# Patient Record
Sex: MALE | Race: WHITE | NOT HISPANIC OR LATINO | Employment: FULL TIME | URBAN - NONMETROPOLITAN AREA
[De-identification: names, ages, dates, MRNs, and addresses within clinical notes are randomized per-mention and may not be internally consistent; named-entity substitution may affect disease eponyms.]

---

## 2023-03-04 LAB
ALANINE AMINOTRANSFERASE (SGPT) (U/L) IN SER/PLAS: 31 U/L (ref 10–52)
ALBUMIN (G/DL) IN SER/PLAS: 3.9 G/DL (ref 3.4–5)
ALKALINE PHOSPHATASE (U/L) IN SER/PLAS: 56 U/L (ref 33–120)
ANION GAP IN SER/PLAS: 11 MMOL/L (ref 10–20)
ASPARTATE AMINOTRANSFERASE (SGOT) (U/L) IN SER/PLAS: 25 U/L (ref 9–39)
BILIRUBIN TOTAL (MG/DL) IN SER/PLAS: 0.5 MG/DL (ref 0–1.2)
CALCIUM (MG/DL) IN SER/PLAS: 9.3 MG/DL (ref 8.6–10.3)
CARBON DIOXIDE, TOTAL (MMOL/L) IN SER/PLAS: 29 MMOL/L (ref 21–32)
CHLORIDE (MMOL/L) IN SER/PLAS: 99 MMOL/L (ref 98–107)
CHOLESTEROL (MG/DL) IN SER/PLAS: 208 MG/DL (ref 0–199)
CHOLESTEROL IN HDL (MG/DL) IN SER/PLAS: 30 MG/DL
CHOLESTEROL/HDL RATIO: 6.9
CREATININE (MG/DL) IN SER/PLAS: 0.64 MG/DL (ref 0.5–1.3)
ERYTHROCYTE DISTRIBUTION WIDTH (RATIO) BY AUTOMATED COUNT: 12.7 % (ref 11.5–14.5)
ERYTHROCYTE MEAN CORPUSCULAR HEMOGLOBIN CONCENTRATION (G/DL) BY AUTOMATED: 33.3 G/DL (ref 32–36)
ERYTHROCYTE MEAN CORPUSCULAR VOLUME (FL) BY AUTOMATED COUNT: 88 FL (ref 80–100)
ERYTHROCYTES (10*6/UL) IN BLOOD BY AUTOMATED COUNT: 4.98 X10E12/L (ref 4.5–5.9)
ESTIMATED AVERAGE GLUCOSE FOR HBA1C: 220 MG/DL
GFR MALE: >90 ML/MIN/1.73M2
GLUCOSE (MG/DL) IN SER/PLAS: 258 MG/DL (ref 74–99)
HEMATOCRIT (%) IN BLOOD BY AUTOMATED COUNT: 43.6 % (ref 41–52)
HEMOGLOBIN (G/DL) IN BLOOD: 14.5 G/DL (ref 13.5–17.5)
HEMOGLOBIN A1C/HEMOGLOBIN TOTAL IN BLOOD: 9.3 %
LDL: 126 MG/DL (ref 0–99)
LEUKOCYTES (10*3/UL) IN BLOOD BY AUTOMATED COUNT: 7.7 X10E9/L (ref 4.4–11.3)
NON HDL CHOLESTEROL: 178 MG/DL
PLATELETS (10*3/UL) IN BLOOD AUTOMATED COUNT: 371 X10E9/L (ref 150–450)
POTASSIUM (MMOL/L) IN SER/PLAS: 4.2 MMOL/L (ref 3.5–5.3)
PROSTATE SPECIFIC ANTIGEN,SCREEN: 0.08 NG/ML (ref 0–4)
PROTEIN TOTAL: 7.9 G/DL (ref 6.4–8.2)
SODIUM (MMOL/L) IN SER/PLAS: 135 MMOL/L (ref 136–145)
THYROTROPIN (MIU/L) IN SER/PLAS BY DETECTION LIMIT <= 0.05 MIU/L: 1.02 MIU/L (ref 0.44–3.98)
TRIGLYCERIDE (MG/DL) IN SER/PLAS: 259 MG/DL (ref 0–149)
UREA NITROGEN (MG/DL) IN SER/PLAS: 13 MG/DL (ref 6–23)
VLDL: 52 MG/DL (ref 0–40)

## 2023-03-06 ENCOUNTER — DOCUMENTATION (OUTPATIENT)
Dept: PRIMARY CARE | Facility: CLINIC | Age: 43
End: 2023-03-06
Payer: COMMERCIAL

## 2023-03-07 PROBLEM — M79.672 BILATERAL LEG AND FOOT PAIN: Status: ACTIVE | Noted: 2023-03-07

## 2023-03-07 PROBLEM — E66.9 OBESITY: Status: ACTIVE | Noted: 2023-03-07

## 2023-03-07 PROBLEM — I89.0 LYMPHEDEMA: Status: ACTIVE | Noted: 2023-03-07

## 2023-03-07 PROBLEM — I10 BENIGN ESSENTIAL HYPERTENSION: Status: ACTIVE | Noted: 2023-03-07

## 2023-03-07 PROBLEM — M25.551 BILATERAL HIP PAIN: Status: ACTIVE | Noted: 2023-03-07

## 2023-03-07 PROBLEM — M79.604 BILATERAL LEG AND FOOT PAIN: Status: ACTIVE | Noted: 2023-03-07

## 2023-03-07 PROBLEM — R07.89 ATYPICAL CHEST PAIN: Status: ACTIVE | Noted: 2023-03-07

## 2023-03-07 PROBLEM — J45.909 ASTHMA (HHS-HCC): Status: ACTIVE | Noted: 2023-03-07

## 2023-03-07 PROBLEM — M79.671 BILATERAL LEG AND FOOT PAIN: Status: ACTIVE | Noted: 2023-03-07

## 2023-03-07 PROBLEM — F41.9 ANXIETY DISORDER: Status: ACTIVE | Noted: 2023-03-07

## 2023-03-07 PROBLEM — D17.0 LIPOMA OF SCALP: Status: ACTIVE | Noted: 2023-03-07

## 2023-03-07 PROBLEM — M54.50 LOW BACK PAIN: Status: ACTIVE | Noted: 2023-03-07

## 2023-03-07 PROBLEM — M25.552 BILATERAL HIP PAIN: Status: ACTIVE | Noted: 2023-03-07

## 2023-03-07 PROBLEM — M79.605 BILATERAL LEG AND FOOT PAIN: Status: ACTIVE | Noted: 2023-03-07

## 2023-03-07 RX ORDER — CHOLECALCIFEROL (VITAMIN D3) 25 MCG
25 TABLET ORAL DAILY
COMMUNITY

## 2023-03-07 RX ORDER — DULOXETIN HYDROCHLORIDE 30 MG/1
1 CAPSULE, DELAYED RELEASE ORAL 2 TIMES DAILY
COMMUNITY
Start: 2019-05-03 | End: 2023-12-18

## 2023-03-07 RX ORDER — LISINOPRIL AND HYDROCHLOROTHIAZIDE 12.5; 2 MG/1; MG/1
2 TABLET ORAL DAILY
COMMUNITY
Start: 2019-04-24 | End: 2023-05-09

## 2023-03-07 RX ORDER — PHENOL 1.4 %
1 AEROSOL, SPRAY (ML) MUCOUS MEMBRANE DAILY
COMMUNITY
Start: 2019-10-02

## 2023-03-07 RX ORDER — METOPROLOL SUCCINATE 25 MG/1
1 TABLET, EXTENDED RELEASE ORAL DAILY
COMMUNITY
Start: 2019-04-19 | End: 2023-05-01

## 2023-03-13 ENCOUNTER — OFFICE VISIT (OUTPATIENT)
Dept: PRIMARY CARE | Facility: CLINIC | Age: 43
End: 2023-03-13
Payer: COMMERCIAL

## 2023-03-13 VITALS
HEIGHT: 69 IN | BODY MASS INDEX: 42.21 KG/M2 | OXYGEN SATURATION: 98 % | WEIGHT: 285 LBS | DIASTOLIC BLOOD PRESSURE: 73 MMHG | HEART RATE: 84 BPM | SYSTOLIC BLOOD PRESSURE: 106 MMHG

## 2023-03-13 DIAGNOSIS — E78.2 MIXED DYSLIPIDEMIA: ICD-10-CM

## 2023-03-13 DIAGNOSIS — E11.9 TYPE 2 DIABETES MELLITUS WITHOUT COMPLICATION, WITHOUT LONG-TERM CURRENT USE OF INSULIN (MULTI): Primary | ICD-10-CM

## 2023-03-13 PROCEDURE — 1036F TOBACCO NON-USER: CPT | Performed by: PHYSICIAN ASSISTANT

## 2023-03-13 PROCEDURE — 3078F DIAST BP <80 MM HG: CPT | Performed by: PHYSICIAN ASSISTANT

## 2023-03-13 PROCEDURE — 3046F HEMOGLOBIN A1C LEVEL >9.0%: CPT | Performed by: PHYSICIAN ASSISTANT

## 2023-03-13 PROCEDURE — 3074F SYST BP LT 130 MM HG: CPT | Performed by: PHYSICIAN ASSISTANT

## 2023-03-13 PROCEDURE — 99214 OFFICE O/P EST MOD 30 MIN: CPT | Performed by: PHYSICIAN ASSISTANT

## 2023-03-13 RX ORDER — METFORMIN HYDROCHLORIDE 500 MG/1
500 TABLET ORAL
Qty: 60 TABLET | Refills: 5 | Status: SHIPPED | OUTPATIENT
Start: 2023-03-13 | End: 2023-07-18

## 2023-03-13 ASSESSMENT — PATIENT HEALTH QUESTIONNAIRE - PHQ9
1. LITTLE INTEREST OR PLEASURE IN DOING THINGS: NOT AT ALL
2. FEELING DOWN, DEPRESSED OR HOPELESS: NOT AT ALL
SUM OF ALL RESPONSES TO PHQ9 QUESTIONS 1 AND 2: 0

## 2023-03-13 NOTE — PROGRESS NOTES
Subjective   Patient ID: Juancarlos Ortiz is a 42 y.o. male who presents for Follow-up (Go over lab results. ).    HPI     Review of Systems    Objective   There were no vitals taken for this visit.    Physical Exam    Assessment/Plan

## 2023-03-13 NOTE — PROGRESS NOTES
"Subjective   Patient ID: Juancarlos Ortiz is a 42 y.o. male who presents for Follow-up (Go over lab results. ).  HPI  Patient presents in follow-up of recent lab draw.  Patient's A1c is 9.3 with mixed dyslipidemia.  Patient was notified of this and presents today to discuss options in treatment and management.  Review of Systems    Constitutional:  See HPI .    Neurologic:  Alert and oriented X4, No numbness, No tingling.    All other systems are negative     Objective     /73   Pulse 84   Ht 1.753 m (5' 9\")   Wt 129 kg (285 lb)   SpO2 98%   BMI 42.09 kg/m²     Physical Exam    General:  Alert and oriented, No acute distress.    Eye:  Pupils are equal, round and reactive to light, Normal conjunctiva.    HENT:  Normocephalic,   Neck:  Supple    Respiratory: Respirations are non-labored   Musculoskeletal: Normal ROM and strength  Integumentary:  Warm, Dry, Intact, No pallor, No rash.    Neurologic:  Alert, Oriented, Normal sensory, Cranial Nerves II-XII are grossly intact  Psychiatric:  Cooperative, Appropriate mood & affect.    Assessment/Plan   Type 2 diabetes: Treatment options reviewed.  Patient is dedicated to dietary changes as well as increased exercise with a goal of not requiring treatment of this.  Metformin 500 mg twice a day for at least 6 months, hopefully not longer.  Follow-up A1c and lipids ordered for that time to be obtained prior to follow-up.  Patient requesting deferring statin to see if lifestyle changes will be adequate.  Problem List Items Addressed This Visit    None  Visit Diagnoses       Type 2 diabetes mellitus without complication, without long-term current use of insulin (CMS/[facility])    -  Primary    Relevant Medications    metFORMIN (Glucophage) 500 mg tablet    Other Relevant Orders    Hemoglobin A1C    Lipid Panel    Mixed dyslipidemia              "

## 2023-05-01 DIAGNOSIS — I10 BENIGN ESSENTIAL HYPERTENSION: Primary | ICD-10-CM

## 2023-05-01 RX ORDER — METOPROLOL SUCCINATE 25 MG/1
TABLET, EXTENDED RELEASE ORAL
Qty: 90 TABLET | Refills: 3 | Status: SHIPPED | OUTPATIENT
Start: 2023-05-01 | End: 2024-04-25

## 2023-05-09 DIAGNOSIS — I10 BENIGN ESSENTIAL HYPERTENSION: Primary | ICD-10-CM

## 2023-05-09 RX ORDER — LISINOPRIL AND HYDROCHLOROTHIAZIDE 12.5; 2 MG/1; MG/1
TABLET ORAL
Qty: 180 TABLET | Refills: 3 | Status: SHIPPED | OUTPATIENT
Start: 2023-05-09 | End: 2024-05-03

## 2023-07-18 DIAGNOSIS — E11.9 TYPE 2 DIABETES MELLITUS WITHOUT COMPLICATION, WITHOUT LONG-TERM CURRENT USE OF INSULIN (MULTI): ICD-10-CM

## 2023-07-18 RX ORDER — METFORMIN HYDROCHLORIDE 500 MG/1
TABLET ORAL
Qty: 180 TABLET | Refills: 3 | Status: SHIPPED | OUTPATIENT
Start: 2023-07-18

## 2023-09-13 ENCOUNTER — APPOINTMENT (OUTPATIENT)
Dept: PRIMARY CARE | Facility: CLINIC | Age: 43
End: 2023-09-13
Payer: COMMERCIAL

## 2023-12-18 DIAGNOSIS — F41.1 GENERALIZED ANXIETY DISORDER: Primary | ICD-10-CM

## 2023-12-18 RX ORDER — DULOXETIN HYDROCHLORIDE 30 MG/1
30 CAPSULE, DELAYED RELEASE ORAL 2 TIMES DAILY
Qty: 180 CAPSULE | Refills: 3 | Status: SHIPPED | OUTPATIENT
Start: 2023-12-18

## 2024-01-02 ENCOUNTER — TELEPHONE (OUTPATIENT)
Dept: PRIMARY CARE | Facility: CLINIC | Age: 44
End: 2024-01-02
Payer: COMMERCIAL

## 2024-01-02 DIAGNOSIS — E11.9 TYPE 2 DIABETES MELLITUS WITHOUT COMPLICATION, WITHOUT LONG-TERM CURRENT USE OF INSULIN (MULTI): ICD-10-CM

## 2024-01-02 DIAGNOSIS — E11.9 TYPE 2 DIABETES MELLITUS WITHOUT COMPLICATION, WITHOUT LONG-TERM CURRENT USE OF INSULIN (MULTI): Primary | ICD-10-CM

## 2024-01-02 RX ORDER — BLOOD SUGAR DIAGNOSTIC
1 STRIP MISCELLANEOUS DAILY PRN
Qty: 100 EACH | Refills: 3 | Status: SHIPPED | OUTPATIENT
Start: 2024-01-02

## 2024-01-02 RX ORDER — IBUPROFEN 200 MG
1 CAPSULE ORAL 2 TIMES DAILY
Qty: 100 STRIP | Refills: 6 | Status: SHIPPED | OUTPATIENT
Start: 2024-01-02

## 2024-01-02 RX ORDER — INSULIN PUMP SYRINGE, 3 ML
1 EACH MISCELLANEOUS 2 TIMES DAILY
COMMUNITY
End: 2024-01-02 | Stop reason: ENTERED-IN-ERROR

## 2024-01-02 RX ORDER — LANCETS
EACH MISCELLANEOUS
Qty: 100 EACH | Refills: 3 | Status: SHIPPED | OUTPATIENT
Start: 2024-01-02

## 2024-01-02 RX ORDER — INSULIN PUMP SYRINGE, 3 ML
1 EACH MISCELLANEOUS AS NEEDED
Qty: 1 EACH | Refills: 0 | Status: SHIPPED | OUTPATIENT
Start: 2024-01-02 | End: 2024-01-02 | Stop reason: SDUPTHER

## 2024-01-20 ENCOUNTER — OFFICE VISIT (OUTPATIENT)
Dept: PRIMARY CARE | Facility: CLINIC | Age: 44
End: 2024-01-20
Payer: COMMERCIAL

## 2024-01-20 ENCOUNTER — LAB (OUTPATIENT)
Dept: LAB | Facility: LAB | Age: 44
End: 2024-01-20
Payer: COMMERCIAL

## 2024-01-20 VITALS
WEIGHT: 281.8 LBS | HEART RATE: 81 BPM | DIASTOLIC BLOOD PRESSURE: 81 MMHG | HEIGHT: 69 IN | BODY MASS INDEX: 41.74 KG/M2 | SYSTOLIC BLOOD PRESSURE: 121 MMHG

## 2024-01-20 DIAGNOSIS — E11.9 TYPE 2 DIABETES MELLITUS WITHOUT COMPLICATION, WITHOUT LONG-TERM CURRENT USE OF INSULIN (MULTI): ICD-10-CM

## 2024-01-20 DIAGNOSIS — F41.9 ANXIETY DISORDER, UNSPECIFIED TYPE: ICD-10-CM

## 2024-01-20 DIAGNOSIS — I10 BENIGN ESSENTIAL HYPERTENSION: Primary | ICD-10-CM

## 2024-01-20 DIAGNOSIS — E78.2 MIXED DYSLIPIDEMIA: ICD-10-CM

## 2024-01-20 LAB
CHOLEST SERPL-MCNC: 249 MG/DL (ref 0–199)
CHOLESTEROL/HDL RATIO: 7.3
EST. AVERAGE GLUCOSE BLD GHB EST-MCNC: 134 MG/DL
HBA1C MFR BLD: 6.3 %
HDLC SERPL-MCNC: 34 MG/DL
LDLC SERPL CALC-MCNC: 137 MG/DL
NON HDL CHOLESTEROL: 215 MG/DL (ref 0–149)
TRIGL SERPL-MCNC: 392 MG/DL (ref 0–149)
VLDL: 78 MG/DL (ref 0–40)

## 2024-01-20 PROCEDURE — 3074F SYST BP LT 130 MM HG: CPT | Performed by: PHYSICIAN ASSISTANT

## 2024-01-20 PROCEDURE — 83036 HEMOGLOBIN GLYCOSYLATED A1C: CPT

## 2024-01-20 PROCEDURE — 1036F TOBACCO NON-USER: CPT | Performed by: PHYSICIAN ASSISTANT

## 2024-01-20 PROCEDURE — 99214 OFFICE O/P EST MOD 30 MIN: CPT | Performed by: PHYSICIAN ASSISTANT

## 2024-01-20 PROCEDURE — 80061 LIPID PANEL: CPT

## 2024-01-20 PROCEDURE — 36415 COLL VENOUS BLD VENIPUNCTURE: CPT

## 2024-01-20 PROCEDURE — 3079F DIAST BP 80-89 MM HG: CPT | Performed by: PHYSICIAN ASSISTANT

## 2024-01-20 NOTE — PROGRESS NOTES
"Subjective   Patient ID: Juancarlos Ortiz is a 43 y.o. male who presents for Follow-up (FU 10 mos,  patient had labs drawn this morning, offers no complaints.).  HPI  Patient presents for annual follow-up and has no complaints.    Patient is currently being treated for hypertension, type 2 diabetes, dyslipidemia, with Cymbalta, lisinopril-hydrochlorothiazide, metformin, and metoprolol.  Patient is doing well with medications and reports making positive lifestyle changes in an effort to reduce A1c and cholesterol.  Patient did have lipids and A1c drawn prior to today's visit but just prior and they are pending.    Review of Systems    Constitutional: See HPI  Neurologic:  Alert and oriented X4, No numbness, No tingling.    All other systems are negative     Objective     /81   Pulse 81   Ht 1.753 m (5' 9\")   Wt 128 kg (281 lb 12.8 oz)   BMI 41.61 kg/m²     Physical Exam  General:  Alert and oriented, No acute distress.    Eye:  Pupils are equal, round and reactive to light, Extraocular movements are intact, Normal conjunctiva.    HENT:  Normocephalic, Normal hearing, Oral mucosa is moist, No pharyngeal erythema, No sinus tenderness.    Neck:  Supple, Non-tender, No lymphadenopathy.    Respiratory:  Lungs are clear to auscultation, Respirations are non-labored, Breath sounds are equal    Cardiovascular:  Normal rate, Regular rhythm.    Gastrointestinal:  Non-distended.    Musculoskeletal: Normal range of motion, Normal strength, No tenderness, No swelling, No deformity, Normal gait.     Integumentary:  Warm, Dry, Intact, No pallor, No rash.    Neurologic:  Alert, Oriented, Normal sensory, Normal motor function, No focal deficits, Cranial Nerves II-XII are grossly intact   Psychiatric:  Cooperative, Appropriate mood & affect.    Assessment/Plan   Hypertension: Good readings today.  Continue lisinopril/hydrochlorothiazide and metoprolol    Anxiety: Continue Cymbalta    Type 2 diabetes: Further recommendations " pending results.      Problem List Items Addressed This Visit       Benign essential hypertension - Primary     Other Visit Diagnoses       Type 2 diabetes mellitus without complication, without long-term current use of insulin (CMS/[facility])        Mixed dyslipidemia                Final diagnoses:   [I10] Benign essential hypertension   [E11.9] Type 2 diabetes mellitus without complication, without long-term current use of insulin (CMS/[facility])   [E78.2] Mixed dyslipidemia

## 2024-01-22 DIAGNOSIS — E78.5 DYSLIPIDEMIA: Primary | ICD-10-CM

## 2024-01-22 RX ORDER — ROSUVASTATIN CALCIUM 10 MG/1
10 TABLET, COATED ORAL DAILY
Qty: 30 TABLET | Refills: 11 | Status: SHIPPED | OUTPATIENT
Start: 2024-01-22 | End: 2025-01-21

## 2024-04-25 DIAGNOSIS — I10 BENIGN ESSENTIAL HYPERTENSION: ICD-10-CM

## 2024-04-25 RX ORDER — METOPROLOL SUCCINATE 25 MG/1
TABLET, EXTENDED RELEASE ORAL
Qty: 90 TABLET | Refills: 3 | Status: SHIPPED | OUTPATIENT
Start: 2024-04-25

## 2024-05-03 DIAGNOSIS — I10 BENIGN ESSENTIAL HYPERTENSION: ICD-10-CM

## 2024-05-03 RX ORDER — LISINOPRIL AND HYDROCHLOROTHIAZIDE 12.5; 2 MG/1; MG/1
TABLET ORAL
Qty: 180 TABLET | Refills: 3 | Status: SHIPPED | OUTPATIENT
Start: 2024-05-03

## 2024-05-03 NOTE — TELEPHONE ENCOUNTER
Pt is jorge on 5/16 w KB, let him know we would send these to her but in order to do more he has to see her. Pt was fine with that.

## 2024-06-11 ASSESSMENT — PROMIS GLOBAL HEALTH SCALE
RATE_SOCIAL_SATISFACTION: VERY GOOD
RATE_AVERAGE_FATIGUE: MILD
CARRYOUT_SOCIAL_ACTIVITIES: VERY GOOD
RATE_MENTAL_HEALTH: VERY GOOD
RATE_QUALITY_OF_LIFE: GOOD
RATE_AVERAGE_PAIN: 3
CARRYOUT_PHYSICAL_ACTIVITIES: COMPLETELY
EMOTIONAL_PROBLEMS: RARELY
RATE_GENERAL_HEALTH: FAIR
RATE_PHYSICAL_HEALTH: FAIR

## 2024-06-12 ENCOUNTER — APPOINTMENT (OUTPATIENT)
Dept: PRIMARY CARE | Facility: CLINIC | Age: 44
End: 2024-06-12
Payer: COMMERCIAL

## 2024-06-12 VITALS
HEART RATE: 75 BPM | HEIGHT: 69 IN | DIASTOLIC BLOOD PRESSURE: 81 MMHG | SYSTOLIC BLOOD PRESSURE: 116 MMHG | BODY MASS INDEX: 41.54 KG/M2 | WEIGHT: 280.5 LBS

## 2024-06-12 DIAGNOSIS — I10 BENIGN ESSENTIAL HYPERTENSION: Primary | ICD-10-CM

## 2024-06-12 PROCEDURE — 99214 OFFICE O/P EST MOD 30 MIN: CPT

## 2024-06-12 PROCEDURE — 3079F DIAST BP 80-89 MM HG: CPT

## 2024-06-12 PROCEDURE — 3074F SYST BP LT 130 MM HG: CPT

## 2024-06-12 PROCEDURE — 1036F TOBACCO NON-USER: CPT

## 2024-06-12 RX ORDER — LISINOPRIL 20 MG/1
20 TABLET ORAL DAILY
Qty: 100 TABLET | Refills: 3 | Status: SHIPPED | OUTPATIENT
Start: 2024-06-12 | End: 2025-07-17

## 2024-06-12 ASSESSMENT — ENCOUNTER SYMPTOMS
NAUSEA: 0
DIARRHEA: 0
FLANK PAIN: 0
ABDOMINAL PAIN: 0
COUGH: 0
HEADACHES: 0
EYE ITCHING: 0
SHORTNESS OF BREATH: 0
EYE PAIN: 0
VOMITING: 0
PALPITATIONS: 0
NUMBNESS: 0
SORE THROAT: 0
CONSTIPATION: 0
COLOR CHANGE: 0
DIFFICULTY URINATING: 0
SINUS PRESSURE: 0
DIZZINESS: 0
HEMATURIA: 0
EYE REDNESS: 0
CHILLS: 0
WEAKNESS: 0
HALLUCINATIONS: 0
FATIGUE: 0
SINUS PAIN: 0
FEVER: 0
BACK PAIN: 0

## 2024-06-12 NOTE — PROGRESS NOTES
"Subjective   Patient ID: Juancarlos Ortiz is a 43 y.o. male who presents for Follow-up.    HPI     Here today for a follow up. Being treated for HTN, DM2, hyperlipidemia, he is also doing some lifestyle changes.  Had SVT with ablation and was placed on metoprolol.    DM2: he continues with fantastic lifestyle changes and has improved his A1C dramatically. We will recheck in December and possibly come off metformin too.    HTN: today pressure looks good, with lifestyle changes it continues to improve. Changed his lisinopril-hydrochlorothiazide to just lisinopril with the goal to come off medication    Anxiety: reports he is doing much better on his cymbalta, and he really likes the medication.     Hyperlipidemia: discontinue crestor today, his goal is to come off medications with his lifestyle changes.    Review of Systems   Constitutional:  Negative for chills, fatigue and fever.   HENT:  Negative for congestion, sinus pressure, sinus pain and sore throat.    Eyes:  Negative for pain, redness and itching.   Respiratory:  Negative for cough and shortness of breath.    Cardiovascular:  Negative for chest pain, palpitations and leg swelling.   Gastrointestinal:  Negative for abdominal pain, constipation, diarrhea, nausea and vomiting.   Endocrine: Negative for cold intolerance and heat intolerance.   Genitourinary:  Negative for difficulty urinating, flank pain and hematuria.   Musculoskeletal:  Negative for back pain and gait problem.   Skin:  Negative for color change.   Neurological:  Negative for dizziness, weakness, numbness and headaches.   Psychiatric/Behavioral:  Negative for hallucinations and suicidal ideas.        Objective   /81 (Patient Position: Sitting)   Pulse 75   Ht 1.753 m (5' 9\")   Wt 127 kg (280 lb 8 oz)   BMI 41.42 kg/m²     Physical Exam  Constitutional:       Appearance: Normal appearance.   Cardiovascular:      Rate and Rhythm: Normal rate and regular rhythm.      Heart sounds: Normal " heart sounds.   Pulmonary:      Effort: Pulmonary effort is normal.      Breath sounds: Normal breath sounds.   Skin:     Capillary Refill: Capillary refill takes less than 2 seconds.   Neurological:      General: No focal deficit present.      Mental Status: He is alert and oriented to person, place, and time.         Assessment/Plan   Problem List Items Addressed This Visit             ICD-10-CM       Cardiac and Vasculature    Benign essential hypertension - Primary I10    Relevant Medications    lisinopril 20 mg tablet

## 2024-07-12 ENCOUNTER — APPOINTMENT (OUTPATIENT)
Dept: PRIMARY CARE | Facility: CLINIC | Age: 44
End: 2024-07-12
Payer: COMMERCIAL

## 2024-07-12 VITALS — SYSTOLIC BLOOD PRESSURE: 112 MMHG | HEART RATE: 73 BPM | DIASTOLIC BLOOD PRESSURE: 78 MMHG

## 2024-07-12 DIAGNOSIS — E11.9 TYPE 2 DIABETES MELLITUS WITHOUT COMPLICATION, WITHOUT LONG-TERM CURRENT USE OF INSULIN (MULTI): ICD-10-CM

## 2024-07-12 DIAGNOSIS — I10 BENIGN ESSENTIAL HYPERTENSION: ICD-10-CM

## 2024-07-12 RX ORDER — METFORMIN HYDROCHLORIDE 500 MG/1
TABLET ORAL
Qty: 180 TABLET | Refills: 3 | Status: SHIPPED | OUTPATIENT
Start: 2024-07-12

## 2024-12-12 DIAGNOSIS — F41.1 GENERALIZED ANXIETY DISORDER: ICD-10-CM

## 2024-12-12 RX ORDER — DULOXETIN HYDROCHLORIDE 30 MG/1
30 CAPSULE, DELAYED RELEASE ORAL 2 TIMES DAILY
Qty: 180 CAPSULE | Refills: 3 | Status: SHIPPED | OUTPATIENT
Start: 2024-12-12

## 2025-01-22 ENCOUNTER — APPOINTMENT (OUTPATIENT)
Dept: PRIMARY CARE | Facility: CLINIC | Age: 45
End: 2025-01-22
Payer: COMMERCIAL